# Patient Record
Sex: FEMALE | Race: WHITE
[De-identification: names, ages, dates, MRNs, and addresses within clinical notes are randomized per-mention and may not be internally consistent; named-entity substitution may affect disease eponyms.]

---
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
## 2018-07-30 ENCOUNTER — HOSPITAL ENCOUNTER (EMERGENCY)
Dept: HOSPITAL 75 - ER | Age: 18
Discharge: HOME | End: 2018-07-30
Payer: MEDICAID

## 2018-07-30 VITALS — BODY MASS INDEX: 19.81 KG/M2 | WEIGHT: 116 LBS | HEIGHT: 64 IN

## 2018-07-30 VITALS — SYSTOLIC BLOOD PRESSURE: 143 MMHG | DIASTOLIC BLOOD PRESSURE: 97 MMHG

## 2018-07-30 DIAGNOSIS — O99.711: Primary | ICD-10-CM

## 2018-07-30 DIAGNOSIS — Z88.1: ICD-10-CM

## 2018-07-30 DIAGNOSIS — Z3A.13: ICD-10-CM

## 2018-07-30 DIAGNOSIS — Z79.52: ICD-10-CM

## 2018-07-30 DIAGNOSIS — L30.9: ICD-10-CM

## 2018-07-30 DIAGNOSIS — Z87.891: ICD-10-CM

## 2018-07-30 PROCEDURE — 99282 EMERGENCY DEPT VISIT SF MDM: CPT

## 2018-07-30 NOTE — XMS REPORT
Continuity of Care Document (Ambulatory Transition of Care)

 Created on: 2018



Kelsie Pruitt

External Reference #: [id number]

: 2000

Sex: Female



Demographics







 Address  [address] [place] [address]  [address]

 

 Home Phone  [phone]

 

 Preferred Language  English

 

 Marital Status  Never 

 

 Yarsanism Affiliation  Unknown

 

 Race  Other Race

 

 Ethnic Group  Not  or 





Author







 Author  [facility]

 

 Organization  [facility]

 

 Address  [address] [place]  [address]



 

 Phone  [phone]







Care Team Providers







 Care Team Member Name  Role  Phone

 

 Unknown, None  PCP  Unavailable

 

 Nirmala Mott  Attphys  [phone]







Allergies, Adverse Reactions, Alerts

No allergy information available.



Medications

No medication information available.



Problem List

No problem information available.



Procedures

No known history of procedures.



Reason for Referral

Referral information is unavailable.



Relevant Diagnostic Tests and/or Laboratory Data

Laboratory Results





 Test  Date/Time  Result  Interp.  Ref. Range  Result Comment

 

 Bedside Urine Pregnancy Test  2018 11:00am  POSITIVE(N=NEGATIVE)     
    

 

 Bedside Urine Pregnancy Test QC  2018 11:00am  ACCEPTABLE         

 

 POC U Pregnancy Lot Number  2018 11:00am  [id number]         

 

 POC U Pregnancy Expiration Date  2018 11:00am  2019         









Chief Complaint and Reason for Visit







 Encounter  Admit Date  Chief Complaint  Reason for Visit

 

 Departed Physician/Provider Office Visit  2018 4:38pm  Eczema/ Preg. 
test   







Hospital Discharge Instructions

No known hospital discharge instructions.



Encounters







 Encounter  Facility  Location  Admit/Visit Date  Discharge/Departure Date  
Attending Provider

 

 Departed Physician/Provider Office Visit  [facility] [facility]  2018 4:38pm  2018 5:26pm  Nirmala Mott







Functional Status

No known functional status.



Immunizations

No known immunizations.



Payers







 Payer Name  Policy Type  Covered Party  Covered Party Id  Relationship  
Subscriber  Subscriber Id

 

 [employer]  Other  Kelsie Edmond  [id number]  Self / Same As 
Patient  Kelsie Edmond  [id number]

 

 Self Pay  Other               







Plan of Care

No Known Plan of Care Information



Social History

No known social history.



Vital Signs

No known vital signs results.
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
## 2018-07-30 NOTE — ED INTEGUMENTARY GENERAL
General


Chief Complaint:  Skin/Wound Problems


Stated Complaint:  RASH


Nursing Triage Note:  


exema


Source:  patient





History of Present Illness


Date Seen by Provider:  2018


Time Seen by Provider:  23:00


Initial Comments


C/O ECZEMA


HAS HAD ECZEMA "ALL HER LIFE" 


HAS BEEN WORSE LATELY AND ESPECIALLY THE LAST FEW WEEKS AND REALLY BAD THE LAST 

3 DAYS


PT STATES SHE IS 13 WEEKS PREGNANT AND "AFRAID THE ECZEMA WILL HURT THE BABY" 


PT STATES SHE HAD HER FIRST OB APPOINTMENT 2 WEEKS AGO AND WAS GIVEN RX FOR 

HYDROCORTISONE CREAM, BUT HAS NOT USED TODAY


NO NEW PRODUCTS, MEDICATIONS, OR EXPOSURES


PT STATES SHE HAD + HOME PREGNANCY TEST 3-4 WEEKS AGO


STATES SHE IS NOT TAKING PRENATAL VITAMINS





[facility]


STATES SHE HAD BRIEFLY MOVED TO [place], [place] FOR 5 MONTHS, BUT JUST MOVED BACK TO 

[place] TODAY.





Allergies and Home Medications


Allergies


Coded Allergies:  


     cephalexin (Verified  Allergy, Intermediate, RASH, 7/2/15)


 


 severe eczema flare





Home Medications


Methylprednisolone 4 Mg Tab.ds.pk, 4 MG PO UD


   Prescribed by: GINO KERR on 18


Mometasone Furoate 15 Gm Cream..g., 0 TP TID


   Prescribed by: GINO KERR on 18





Patient Home Medication List


Home Medication List Reviewed:  Yes





Constitutional:  no symptoms reported


EENTM:  no symptoms reported


Respiratory:  no symptoms reported


Cardiovascular:  no symptoms reported


Gastrointestinal:  no symptoms reported


Pregnant:  Yes


LMP:  2018


Musculoskeletal:  no symptoms reported


Skin:  see HPI, pruritus, rash


Psychiatric/Neurological:  No Symptoms Reported


Endocrine:  No Symptoms Reported


Hematologic/Lymphatic:  No Symptoms Reported





Past Medical-Social-Family Hx


Patient Social History


Alcohol Use:  Denies Use


Recreational Drug Use:  No


Smoking Status:  Former Smoker


Type Used:  Cigarettes


2nd Hand Smoke Exposure:  Yes


Recent Foreign Travel:  No


Contact w/Someone Who Travel:  No


Recent Infectious Disease Expo:  No


Recent Hopitalizations:  No





Immunizations Up To Date


Tetanus Booster (TDap):  Less than 5yrs


PED Vaccines UTD:  Yes


Date of Influenza Vaccine:  Oct 26, 2017





Seasonal Allergies


Seasonal Allergies:  Yes





Past Medical History


Surgeries:  No


Respiratory:  No


Cardiac:  No


Neurological:  No


Pregnant:  Yes


Hx :  1


Hx Para:  0


Hx Total # of Abortions (Sp):  0


Reproductive Disorders:  No


Genitourinary:  No


Gastrointestinal:  No


Musculoskeletal:  No


Endocrine:  No


HEENT:  No


Cancer:  No


Psychosocial:  No


Integumentary:  Yes


Eczema, Recent Skin Changes


Blood Disorders:  No


Adverse Reaction/Blood Tranf:  No





Family Medical History


No Pertinent Family Hx





Physical Exam


Vital Signs





Vital Signs - First Documented








 18





 22:55 23:12


 


Temp 98.0 


 


Pulse 113 


 


Resp 18 


 


B/P (MAP) 143/97 


 


Pulse Ox  95


 


O2 Delivery Room Air 





Capillary Refill :


General Appearance:  WD/WN, no apparent distress, other (LAUGHING)


Cardiovascular:  normal peripheral pulses, regular rate, rhythm, no murmur


Respiratory:  normal breath sounds


Extremities:  normal range of motion, no pedal edema, normal capillary refill


Neurologic/Psychiatric:  CNs II-XII nml as tested, no motor/sensory deficits, 

alert, normal mood/affect, oriented x 3


Skin:  normal color, warm/dry, rash (DIFFUSE/PATCHY, DRY, SLIGHTLY SCALY, 

MACULOPAPULAR RASH  SCATTERED ON AC SPACES AND ELBOWS, FOREARMS, POSTERIOR 

THIGHS, POPLITEAL AREAS, LOWER BACK AND BUTTOCKS, ABDOMEN--HAS TYPICAL 

APPEARANCE OF ECZEMA)





Progress/Results/Core Measures


Results/Orders


Vital Signs/I&O











 18





 22:55 23:12


 


Temp 98.0 98.0


 


Pulse 113 113


 


Resp 18 18


 


B/P (MAP) 143/97 143/97


 


Pulse Ox  95


 


O2 Delivery Room Air Room Air











Departure


Impression





 Primary Impression:  


 Eczema


Disposition:  01 HOME, SELF-CARE


Condition:  Stable





Departure-Patient Inst.


Referrals:  


[facility] (PCP/Family)


Primary Care Physician


Patient Instructions:  Eczema (Atopic Dermatitis) (DC)





Add. Discharge Instructions:  


FOLLOW UP WITH [facility] THIS WEEK FOR FURTHER CARE





All discharge instructions reviewed with patient and/or family. Voiced 

understanding.


Scripts


Mometasone Furoate (Elocon) 15 Gm Cream..g.


0 TP TID, #1 TUBE


   Prov: GINO KERR DO         18 


Methylprednisolone (Medrol) 4 Mg Tab.ds.pk


4 MG PO UD, #1 PKG


   Prov: GINO KERR DO         18











GINO KERR DO 2018 23:07

## 2018-07-30 NOTE — XMS REPORT
Continuity of Care Document (Ambulatory Transition of Care)

 Created on: 2018



Emamunir Kelsie

External Reference #: [id number]

: 2000

Sex: Female



Demographics







 Address  [address] [place] [address]  [address]

 

 Home Phone  [phone]

 

 Preferred Language  English

 

 Marital Status  Never 

 

 [employer] Affiliation  Unknown

 

 Race  Other Race

 

 Ethnic Group  Not  or 





Author







 Author  [facility]

 

 Organization  [facility]

 

 Address  [address] [place]  [address]



 

 Phone  [phone]







Care Team Providers







 Care Team Member Name  Role  Phone

 

 Unknown, None  PCP  Unavailable

 

 Kwaku Walker  Attphys  [phone]







Allergies, Adverse Reactions, Alerts







 Allergen  Type  Severity  Reaction  Last Updated  Verified  Status

 

 cephalexin  Allergy  Moderate  Hives  2018  Y  Active







Medications

Active Medications





 Medication  Dose  Units  Route  Sig  Qty  Start Date  Status

 

 Hydrocortisone 2.5% Lotion  1  APPLIC  Topical  twice a day  118  2018  Active







Problem List

No problem information available.



Procedures

No known history of procedures.



Reason for Referral

Referral information is unavailable.



Relevant Diagnostic Tests and/or Laboratory Data

Laboratory Results





 Test  Date/Time  Result  Interp.  Ref. Range  Result Comment

 

 Bedside Urine Pregnancy Test  2018 11:00am  POSITIVE(N=NEGATIVE)     
    

 

 Bedside Urine Pregnancy Test QC  2018 11:00am  ACCEPTABLE         

 

 POC U Pregnancy Lot Number  2018 11:00am  [id number]         

 

 POC U Pregnancy Expiration Date  2018 11:00am  2019         









Chief Complaint and Reason for Visit







 Encounter  Admit Date  Chief Complaint  Reason for Visit

 

 Departed Physician/Provider Office Visit  2018 9:38am  pregnancy   







Hospital Discharge Instructions

No known hospital discharge instructions.



Hospital Discharge Medications







 Medication  Dose  Units  Route  Sig  Qty  Days  Order Date  Status  
Instructions

 

 Hydrocortisone 2.5% Lotion  1  APPLIC  Topical  twice a day  118     2018  Active   







Encounters







 Encounter  Facility  Location  Admit/Visit Date  Discharge/Departure Date  
Attending Provider

 

 Departed Physician/Provider Office Visit  [facility]  2018 9:38am  2018 10:36am  Kwaku Walker

 

 Departed Physician/Provider Office Visit  [facility]  2018 4:38pm  2018 5:26pm  Nirmala Mott







Functional Status

No known functional status.



Immunizations

No known immunizations.



Payers







 Payer Name  Policy Type  Covered Party  Covered Party Id  Relationship  
Subscriber  Subscriber Id

 

 [employer]  Other  Kelsie Pruitt  [id number]  Self / Same As 
Patient  Kelsie Pruitt  [id number]

 

 Self Pay  Other               







Plan of Care

No Known Plan of Care Information



Social History

No known social history.



Vital Signs







 Vital Reading  Result  Reference Range  Collection Date/Time

 

 Height  5 ft 4.5 in     2018 10:40am

 

 Weight  112 lb     2018 10:40am

 

 Temperature  98.3 F  97.6 F-99.5 F  2018 10:40am

 

 Pulse  101 BPM    2018 10:40am

 

 Respiration  18 RPM  10-24  2018 10:40am

 

 Pulse Oximetry  100 %    2018 10:40am

 

 Blood Pressure Systolic  126  100-180  2018 10:40am

 

 Blood Pressure Diastolic  70  60-90  2018 10:40am

 

 Body Mass Index  18.9     2018 10:40am

## 2018-08-16 ENCOUNTER — HOSPITAL ENCOUNTER (EMERGENCY)
Dept: HOSPITAL 75 - ER | Age: 18
Discharge: HOME | End: 2018-08-16
Payer: MEDICAID

## 2018-08-16 VITALS — WEIGHT: 115 LBS | BODY MASS INDEX: 19.63 KG/M2 | HEIGHT: 64 IN

## 2018-08-16 DIAGNOSIS — Z88.8: ICD-10-CM

## 2018-08-16 DIAGNOSIS — Z79.52: ICD-10-CM

## 2018-08-16 DIAGNOSIS — L30.9: ICD-10-CM

## 2018-08-16 DIAGNOSIS — Z88.1: ICD-10-CM

## 2018-08-16 DIAGNOSIS — O99.712: Primary | ICD-10-CM

## 2018-08-16 DIAGNOSIS — L03.115: ICD-10-CM

## 2018-08-16 DIAGNOSIS — Z87.440: ICD-10-CM

## 2018-08-16 DIAGNOSIS — Z87.891: ICD-10-CM

## 2018-08-16 DIAGNOSIS — L03.116: ICD-10-CM

## 2018-08-16 DIAGNOSIS — Z3A.15: ICD-10-CM

## 2018-08-16 LAB
ALBUMIN SERPL-MCNC: 3.9 GM/DL (ref 3.2–4.5)
ALP SERPL-CCNC: 50 U/L (ref 40–136)
ALT SERPL-CCNC: 16 U/L (ref 0–55)
APTT PPP: YELLOW S
BACTERIA #/AREA URNS HPF: (no result) /HPF
BARBITURATES UR QL: NEGATIVE
BASOPHILS # BLD AUTO: 0.1 10^3/UL (ref 0–0.1)
BASOPHILS NFR BLD AUTO: 1 % (ref 0–10)
BENZODIAZ UR QL SCN: NEGATIVE
BILIRUB SERPL-MCNC: 0.4 MG/DL (ref 0.1–1)
BILIRUB UR QL STRIP: NEGATIVE
BUN/CREAT SERPL: 8
CALCIUM SERPL-MCNC: 9 MG/DL (ref 8.5–10.1)
CHLORIDE SERPL-SCNC: 108 MMOL/L (ref 98–107)
CK MB SERPL-MCNC: 0.5 NG/ML (ref ?–6.6)
CK SERPL-CCNC: 25 U/L (ref 29–168)
CO2 SERPL-SCNC: 18 MMOL/L (ref 21–32)
COCAINE UR QL: NEGATIVE
CREAT SERPL-MCNC: 0.61 MG/DL (ref 0.6–1.3)
EOSINOPHIL # BLD AUTO: 1.3 10^3/UL (ref 0–0.3)
EOSINOPHIL NFR BLD AUTO: 11 % (ref 0–10)
ERYTHROCYTE [DISTWIDTH] IN BLOOD BY AUTOMATED COUNT: 13.9 % (ref 10–14.5)
ERYTHROCYTE [SEDIMENTATION RATE] IN BLOOD: 13 MM/HR (ref 0–20)
FIBRINOGEN PPP-MCNC: (no result) MG/DL
GFR SERPLBLD BASED ON 1.73 SQ M-ARVRAT: > 60 ML/MIN
GLUCOSE SERPL-MCNC: 126 MG/DL (ref 70–105)
GLUCOSE UR STRIP-MCNC: NEGATIVE MG/DL
HCT VFR BLD CALC: 38 % (ref 35–52)
HGB BLD-MCNC: 13.6 G/DL (ref 11.5–16)
KETONES UR QL STRIP: NEGATIVE
LEUKOCYTE ESTERASE UR QL STRIP: (no result)
LYMPHOCYTES # BLD AUTO: 2 X 10^3 (ref 1–4)
LYMPHOCYTES NFR BLD AUTO: 18 % (ref 12–44)
MANUAL DIFFERENTIAL PERFORMED BLD QL: NO
MCH RBC QN AUTO: 34 PG (ref 25–34)
MCHC RBC AUTO-ENTMCNC: 36 G/DL (ref 32–36)
MCV RBC AUTO: 93 FL (ref 80–99)
METHADONE UR QL SCN: NEGATIVE
METHAMPHETAMINE SCREEN URINE S: NEGATIVE
MONOCYTES # BLD AUTO: 0.7 X 10^3 (ref 0–1)
MONOCYTES NFR BLD AUTO: 6 % (ref 0–12)
NEUTROPHILS # BLD AUTO: 7.2 X 10^3 (ref 1.8–7.8)
NEUTROPHILS NFR BLD AUTO: 64 % (ref 42–75)
NITRITE UR QL STRIP: NEGATIVE
OPIATES UR QL SCN: NEGATIVE
OXYCODONE UR QL: NEGATIVE
PH UR STRIP: 6 [PH] (ref 5–9)
PLATELET # BLD: 262 10^3/UL (ref 130–400)
PMV BLD AUTO: 8.9 FL (ref 7.4–10.4)
POTASSIUM SERPL-SCNC: 3.7 MMOL/L (ref 3.6–5)
PROPOXYPH UR QL: NEGATIVE
PROT SERPL-MCNC: 6.4 GM/DL (ref 6.4–8.2)
PROT UR QL STRIP: NEGATIVE
RBC # BLD AUTO: 4.03 10^6/UL (ref 4.35–5.85)
RBC #/AREA URNS HPF: (no result) /HPF
SODIUM SERPL-SCNC: 137 MMOL/L (ref 135–145)
SP GR UR STRIP: 1.02 (ref 1.02–1.02)
SQUAMOUS #/AREA URNS HPF: >50 /HPF
TRICYCLICS UR QL SCN: NEGATIVE
UROBILINOGEN UR-MCNC: 1 MG/DL
WBC # BLD AUTO: 11.2 10^3/UL (ref 4.3–11)
WBC #/AREA URNS HPF: (no result) /HPF

## 2018-08-16 PROCEDURE — 82550 ASSAY OF CK (CPK): CPT

## 2018-08-16 PROCEDURE — 80306 DRUG TEST PRSMV INSTRMNT: CPT

## 2018-08-16 PROCEDURE — 87077 CULTURE AEROBIC IDENTIFY: CPT

## 2018-08-16 PROCEDURE — 82553 CREATINE MB FRACTION: CPT

## 2018-08-16 PROCEDURE — 87430 STREP A AG IA: CPT

## 2018-08-16 PROCEDURE — 86141 C-REACTIVE PROTEIN HS: CPT

## 2018-08-16 PROCEDURE — 81000 URINALYSIS NONAUTO W/SCOPE: CPT

## 2018-08-16 PROCEDURE — 87205 SMEAR GRAM STAIN: CPT

## 2018-08-16 PROCEDURE — 85025 COMPLETE CBC W/AUTO DIFF WBC: CPT

## 2018-08-16 PROCEDURE — 87186 SC STD MICRODIL/AGAR DIL: CPT

## 2018-08-16 PROCEDURE — 85652 RBC SED RATE AUTOMATED: CPT

## 2018-08-16 PROCEDURE — 80053 COMPREHEN METABOLIC PANEL: CPT

## 2018-08-16 PROCEDURE — 36415 COLL VENOUS BLD VENIPUNCTURE: CPT

## 2018-08-16 PROCEDURE — 87070 CULTURE OTHR SPECIMN AEROBIC: CPT

## 2018-08-16 NOTE — ED GENERAL
General


Chief Complaint:  Skin/Wound Problems


Stated Complaint:  BACK OF LEGS OOZING GREEN STUFF, RASH ON FACE


Nursing Triage Note:  


Pt reports rash to bilat legs and arms. Pt reports she has hx eczema and states 


she thinks rash is infected and it is painful to walk. Pt also reports she is 


approx 15 weeks pregnant. Pt reports she is currently on amoxicillin for UTI.


Source of Information:  Patient, Old Records


Exam Limitations:  No Limitations





History of Present Illness


Date Seen by Provider:  Aug 16, 2018


Time Seen by Provider:  20:30


Initial Comments


This 17-year-old at approximately 15 weeks gestational age presents to the 

emergency room with severe worsening of her chronic eczema.  She was seen in 

this ER about 2 weeks ago for the same.  She has tried multiple types of 

topical antibiotics, a Medrol Dosepak, and Elocon without significant relief.  

She has burning of the skin particularly in the legs.  She has excoriations on 

her extremities and trunk and warm erythema on both thighs.  Distribution 

appears fairly symmetrical.  Patient is also noted to be tachycardic and 

appears dry.  Patient has been using amoxicillin for a couple of days due to 

urinary tract infection.  Patient states oral steroids use previously were not 

helpful in treating the rash.  She has a new prescription for topical 

medications prescribed by Dr. MENON but has not filled it yet.  Review of 

patient's chart notes that she also had a urine drug screen positive for 

cocaine last December.





Allergies and Home Medications


Allergies


Coded Allergies:  


     cephalexin (Verified  Allergy, Intermediate, RASH, 7/2/15)


 


 severe eczema flare


     metronidazole (Unverified  Allergy, Unknown, 8/16/18)





Home Medications


Clindamycin HCl 300 Mg Capsule, 300 MG PO QID


   Prescribed by: DARLENE HELMS on 8/16/18 2150


Methylprednisolone 4 Mg Tab.ds.pk, 4 MG PO UD


   Prescribed by: GINO KERR on 7/30/18 2307


Mometasone Furoate 15 Gm Cream..g., 0 TP TID


   Prescribed by: GINO KERR on 7/30/18 2307





Patient Home Medication List


Home Medication List Reviewed:  Yes





Review of Systems


Constitutional:  no symptoms reported


EENTM:  no symptoms reported


Respiratory:  no symptoms reported


Cardiovascular:  no symptoms reported


Gastrointestinal:  no symptoms reported


Genitourinary:  no symptoms reported


Pregnant:  Yes


LMP:  Apr 25, 2018


Musculoskeletal:  no symptoms reported


Skin:  see HPI


Psychiatric/Neurological:  No Symptoms Reported


Hematologic/Lymphatic:  No Symptoms Reported


Immunological/Allergic:  no symptoms reported





Past Medical-Social-Family Hx


Past Med/Social Hx:  Reviewed Nursing Past Med/Soc Hx


Patient Social History


Alcohol Use:  Denies Use


Recreational Drug Use:  No


Smoking Status:  Former Smoker


Type Used:  Cigarettes


2nd Hand Smoke Exposure:  Yes


Recent Foreign Travel:  No


Contact w/Someone Who Travel:  No


Recent Infectious Disease Expo:  No


Recent Hopitalizations:  No





Immunizations Up To Date


Tetanus Booster (TDap):  Less than 5yrs


PED Vaccines UTD:  Yes


Date of Influenza Vaccine:  Oct 26, 2017





Seasonal Allergies


Seasonal Allergies:  Yes





Past Medical History


Surgeries:  No


Respiratory:  No


Cardiac:  No


Neurological:  No


Reproductive Disorders:  No


Genitourinary:  No


Gastrointestinal:  No


Musculoskeletal:  No


Endocrine:  No


HEENT:  No


Cancer:  No


Psychosocial:  No


Integumentary:  Yes


Eczema, Recent Skin Changes


Blood Disorders:  No


Adverse Reaction/Blood Tranf:  No





Family Medical History


Reviewed Nursing Family Hx


No Pertinent Family Hx





Physical Exam


Vital Signs





Vital Signs - First Documented








 8/16/18





 20:32


 


Temp 97.8


 


Pulse 113


 


Resp 20


 


B/P (MAP) 131/88


 


O2 Delivery Room Air





Capillary Refill :


Height, Weight, BMI


Height: 5'4.00"


Weight: 115lbs. oz. 52.669782br; 14.06 BMI


Method:Stated


General Appearance:  WD/WN, Mild Distress


HEENT:  PERRL/EOMI, Normal ENT Inspection, Pharynx Normal


Neck:  Normal Inspection


Respiratory:  Lungs Clear, Normal Breath Sounds, No Accessory Muscle Use, No 

Respiratory Distress


Cardiovascular:  No Edema, No Murmur, Tachycardia (regular)


Gastrointestinal:  Normal Bowel Sounds, Non Tender, Soft


Extremity:  Normal Capillary Refill, Other (excoriations throughout the 

extremities with warm blanching erythema on the thighs.  There are some areas 

of excoriation with clear weeping on the posterior legs and thighs.  Rash on 

the abdomen has a fine, rest, maculopapular a appearance.)


Neurologic/Psychiatric:  Alert, Oriented x3, No Motor/Sensory Deficits, Normal 

Mood/Affect, CNs II-XII Norm as Tested, Other (mild generalized tremor)


Skin:  Rash, Other (see above)





Progress/Results/Core Measures


Suspected Sepsis


SIRS


Temperature:97.8 


Pulse:  


Respiratory Rate: 


 


Laboratory Tests


8/16/18 20:45: White Blood Count 11.2H


Blood Pressure  / 


Mean: 


 





Laboratory Tests


8/16/18 20:45: 


Creatinine 0.61, Platelet Count 262, Total Bilirubin 0.4








Results/Orders


Lab Results





Laboratory Tests








Test


 8/16/18


20:38 8/16/18


20:45 8/16/18


21:00 8/16/18


21:04 Range/Units


 


 


Group A Streptococcus Screen NEGATIVE     NEGATIVE  


 


White Blood Count


 


 11.2 H


 


 


 4.3-11.0


10^3/uL


 


Red Blood Count


 


 4.03 L


 


 


 4.35-5.85


10^6/uL


 


Hemoglobin  13.6    11.5-16.0  G/DL


 


Hematocrit  38    35-52  %


 


Mean Corpuscular Volume  93    80-99  FL


 


Mean Corpuscular Hemoglobin  34    25-34  PG


 


Mean Corpuscular Hemoglobin


Concent 


 36 


 


 


 32-36  G/DL





 


Red Cell Distribution Width  13.9    10.0-14.5  %


 


Platelet Count


 


 262 


 


 


 130-400


10^3/uL


 


Mean Platelet Volume  8.9    7.4-10.4  FL


 


Neutrophils (%) (Auto)  64    42-75  %


 


Lymphocytes (%) (Auto)  18    12-44  %


 


Monocytes (%) (Auto)  6    0-12  %


 


Eosinophils (%) (Auto)  11 H   0-10  %


 


Basophils (%) (Auto)  1    0-10  %


 


Neutrophils # (Auto)  7.2    1.8-7.8  X 10^3


 


Lymphocytes # (Auto)  2.0    1.0-4.0  X 10^3


 


Monocytes # (Auto)  0.7    0.0-1.0  X 10^3


 


Eosinophils # (Auto)


 


 1.3 H


 


 


 0.0-0.3


10^3/uL


 


Basophils # (Auto)


 


 0.1 


 


 


 0.0-0.1


10^3/uL


 


Erythrocyte Sedimentation Rate  13    0-20  MM/HR


 


Sodium Level  137    135-145  MMOL/L


 


Potassium Level  3.7    3.6-5.0  MMOL/L


 


Chloride Level  108 H     MMOL/L


 


Carbon Dioxide Level  18 L   21-32  MMOL/L


 


Anion Gap  11    5-14  MMOL/L


 


Blood Urea Nitrogen  5 L   7-18  MG/DL


 


Creatinine


 


 0.61 


 


 


 0.60-1.30


MG/DL


 


Estimat Glomerular Filtration


Rate 


 > 60 


 


 


  





 


BUN/Creatinine Ratio  8     


 


Glucose Level  126 H     MG/DL


 


Calcium Level  9.0    8.5-10.1  MG/DL


 


Corrected Calcium  9.1    8.5-10.1  MG/DL


 


Total Bilirubin  0.4    0.1-1.0  MG/DL


 


Aspartate Amino Transf


(AST/SGOT) 


 18 


 


 


 5-34  U/L





 


Alanine Aminotransferase


(ALT/SGPT) 


 16 


 


 


 0-55  U/L





 


Alkaline Phosphatase  50      U/L


 


Creatine Kinase MB  0.5    <6.6  NG/ML


 


Total Protein  6.4    6.4-8.2  GM/DL


 


Albumin  3.9    3.2-4.5  GM/DL


 


Total Creatine Kinase   25 L    U/L


 


C-Reactive Protein High


Sensitivity 


 


 2.80 H


 


 0.00-0.50


MG/DL


 


Urine Color    YELLOW   


 


Urine Clarity


 


 


 


 SLIGHTLY


CLOUDY  





 


Urine pH    6  5-9  


 


Urine Specific Gravity    1.020  1.016-1.022  


 


Urine Protein    NEGATIVE  NEGATIVE  


 


Urine Glucose (UA)    NEGATIVE  NEGATIVE  


 


Urine Ketones    NEGATIVE  NEGATIVE  


 


Urine Nitrite    NEGATIVE  NEGATIVE  


 


Urine Bilirubin    NEGATIVE  NEGATIVE  


 


Urine Urobilinogen    1  NORMAL  MG/DL


 


Urine Leukocyte Esterase    2+ H NEGATIVE  


 


Urine RBC (Auto)    1+ H NEGATIVE  


 


Urine RBC    2-5 H  /HPF


 


Urine WBC    2-5   /HPF


 


Urine Squamous Epithelial


Cells 


 


 


 >50 H


  /HPF





 


Urine Crystals    NONE   /LPF


 


Urine Bacteria    LARGE H  /HPF


 


Urine Casts    NONE   /LPF


 


Urine Mucus    NEGATIVE   /LPF


 


Urine Culture Indicated    NO   


 


Urine Opiates Screen    NEGATIVE  NEGATIVE  


 


Urine Oxycodone Screen    NEGATIVE  NEGATIVE  


 


Urine Methadone Screen    NEGATIVE  NEGATIVE  


 


Urine Propoxyphene Screen    NEGATIVE  NEGATIVE  


 


Urine Barbiturates Screen    NEGATIVE  NEGATIVE  


 


Ur Tricyclic Antidepressants


Screen 


 


 


 NEGATIVE 


 NEGATIVE  





 


Urine Phencyclidine Screen    NEGATIVE  NEGATIVE  


 


Urine Amphetamines Screen    NEGATIVE  NEGATIVE  


 


Urine Methamphetamines Screen    NEGATIVE  NEGATIVE  


 


Urine Benzodiazepines Screen    NEGATIVE  NEGATIVE  


 


Urine Cocaine Screen    NEGATIVE  NEGATIVE  


 


Urine Cannabinoids Screen    POSITIVE H NEGATIVE  








My Orders





Orders - DARLENE DURAN MD


Saline Lock/Iv-Start (8/16/18 20:41)


Ns Iv 1000 Ml (Sodium Chloride 0.9%) (8/16/18 20:41)


Cbc With Automated Diff (8/16/18 20:41)


Comprehensive Metabolic Panel (8/16/18 20:41)


Creatine Kinase Mb (8/16/18 20:41)


Erythrocyte Sedimentation Rate (8/16/18 20:41)


Rapid Strep A Screen (8/16/18 20:41)


Drug Screen Stat (Urine) (8/16/18 20:43)


Diphenhydramine Injection (Benadryl Inje (8/16/18 20:45)


Wound Culture (8/16/18 20:56)


Creatine Kinase (8/16/18 21:00)


Hs C Reactive Protein (8/16/18 21:01)


Ua Culture If Indicated (8/16/18 21:09)


Clindamycin 900 Mg/50 Ml Ivpb (Cleocin P (8/16/18 21:45)





Medications Given in ED





Current Medications








 Medications  Dose


 Ordered  Sig/Oracio


 Route  Start Time


 Stop Time Status Last Admin


Dose Admin


 


 Clindamycin


 Phosphate/Dextrose  50 ml @ 


 100 mls/hr  ONCE  ONCE


 IV  8/16/18 21:45


 8/16/18 22:14 DC 8/16/18 21:47


100 MLS/HR


 


 Diphenhydramine


 HCl  25 mg  ONCE  ONCE


 IVP  8/16/18 20:45


 8/16/18 20:47 DC 8/16/18 20:50


25 MG


 


 Sodium Chloride  1,000 ml @ 


 0 mls/hr  Q0M ONCE


 IV  8/16/18 20:41


 8/16/18 20:43 DC 8/16/18 20:46


0 MLS/HR








Vital Signs/I&O











 8/16/18





 20:32


 


Temp 97.8


 


Pulse 113


 


Resp 20


 


B/P (MAP) 131/88


 


O2 Delivery Room Air





Capillary Refill :


Progress Note #1:  


Progress Note


Patient seen and examined.  Inflammatory markers are being drawn along with 

basic labs.  IV fluids have been initiated due to tachycardia.  UDS is being 

obtained.  One culture was collected from the drainage on the posterior thighs.


Progress Note #2:  


Progress Note


Labs were relatively unremarkable.  Given the warm erythema of the thighs, 

secondary cellulitis was a concern.  A dose of IV clindamycin was administered 

in the ER and her oral antibiotics were switched to clindamycin from 

amoxicillin.  Patient was given Benadryl for her itching and tremoring which 

was moderately effective.  She was hydrated with a liter of IV fluid with 

improvement in heart rate.  Urinalysis was positive for THC which patient 

states is from secondhand exposure.  She was advised to avoid exposure to 

psychoactive substances as they may make her symptoms worsen and can be 

dangerous to the pregnancy.  We discussed continued use of topical 

triamcinolone as well and nonpharmacologic interventions for eczema.  I also 

advised that a referral to a dermatologist may be wise.  See discharge 

instructions.





Departure


Impression





 Primary Impression:  


 Cellulitis of lower extremity


 Qualified Codes:  L03.119 - Cellulitis of unspecified part of limb


 Additional Impression:  


 Eczema


 Qualified Codes:  L30.9 - Dermatitis, unspecified


Disposition:  01 HOME, SELF-CARE


Condition:  Improved





Departure-Patient Inst.


Decision time for Depature:  21:40


Referrals:  


[facility] (PCP/Family)


Primary Care Physician


Patient Instructions:  Cellulitis (Skin Infection), Adult (DC), Eczema (Atopic 

Dermatitis)





Add. Discharge Instructions:  


You may continue using triamcinolone topically as previously directed.


Avoid excessive use of hot water and soaps when bathing.


Use the triamcinolone on top of moist skin after bathing.


Change the amoxicillin you were taking to clindamycin for possible skin 

cellulitis.  Return to care if symptoms are worsening, especially if you 

develop fevers over 100.


For itching you may take Benadryl up to 25 mg every 4 hours as needed.  It 

would also be wise to take a long-acting antihistamine such as Claritin (

loratadine) or Zyrtec (cetirizine) every day.


Return to your primary care provider soon as possible.  Call tomorrow morning 

for an appointment.  Review culture results with your provider early next week.


For pain you may take Tylenol (acetaminophen) up to 650 mg every 6 hours as 

needed.











All discharge instructions reviewed with patient and/or family. Voiced 

understanding.


Scripts


Clindamycin HCl (Clindamycin HCl) 300 Mg Capsule


300 MG PO QID, #40 CAP


   Prov: DARLENE DURAN MD         8/16/18





Copy


Copies To 1:   KIM MENON DO


Copies To 2:   TEJ PAREKH JOSHUA T MD Aug 16, 2018 20:52
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
## 2018-08-17 NOTE — XMS REPORT
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        

 

 Complete urinalysis with reflex to culture            YES             NRG     
   

 

 Amorphous sediment detection in urine sediment by light microscopy            
MOD ELDA URATES             NRG        









 Bacterial urine culture - 17 23:40         









 URINE CULTURE RESULTS            10,000/ML - 100,000/ML             NRG        









 Urine drug screening test - 17 01:26         









 Urine phencyclidine detection by screening method            NEGATIVE         
    NEGATIVE        

 

 Urine benzodiazepines detection by screening method            NEGATIVE       
      NEGATIVE        

 

 Urine cocaine detection            POSITIVE             NEGATIVE        

 

 Urine amphetamines detection by screening method            NEGATIVE          
   NEGATIVE        

 

 Urine methamphetamine detection by screening method            NEGATIVE       
      NEGATIVE        

 

 Urine cannabinoids detection by screening method            NEGATIVE          
   NEGATIVE        

 

 Urine opiates detection by screening method            NEGATIVE             
NEGATIVE        

 

 Urine barbiturates detection            NEGATIVE             NEGATIVE        

 

 Screening urine tricyclic antidepressants detection            NEGATIVE       
      NEGATIVE        

 

 Urine methadone detection by screening method            NEGATIVE             
NEGATIVE        

 

 Urine oxycodone detection            NEGATIVE             NEGATIVE        

 

 Urine propoxyphene detection            NEGATIVE             NEGATIVE        









 Complete urinalysis with reflex to culture - 17 01:26         









 Urine color determination            YELLOW             NRG        

 

 Urine clarity determination            CLEAR             NRG        

 

 Urine pH measurement by test strip            6             5-9        

 

 Specific gravity of urine by test strip            1.025             1.016-
1.022        

 

 Urine protein assay by test strip, semi-quantitative            1+             
NEGATIVE        

 

 Urine glucose detection by automated test strip            NEGATIVE           
  NEGATIVE        

 

 Erythrocytes detection in urine sediment by light microscopy            5+    
         NEGATIVE        

 

 Urine ketones detection by automated test strip            1+             
NEGATIVE        

 

 Urine nitrite detection by test strip            NEGATIVE             NEGATIVE
        

 

 Urine total bilirubin detection by test strip            NEGATIVE             
NEGATIVE        

 

 Urine urobilinogen measurement by automated test strip (mass/volume)          
  NORMAL             NORMAL        

 

 Urine leukocyte esterase detection by dipstick            NEGATIVE             
NEGATIVE        

 

 Automated urine sediment erythrocyte count by microscopy (number/high power 
field)             [HPF]            NRG        

 

 Automated urine sediment leukocyte count by microscopy (number/high power field
)            NONE             NRG        

 

 Bacteria detection in urine sediment by light microscopy            TRACE     
        NRG        

 

 Squamous epithelial cells detection in urine sediment by light microscopy     
       2-5             NRG        

 

 Crystals detection in urine sediment by light microscopy            NONE      
       NRG        

 

 Casts detection in urine sediment by light microscopy            NONE         
    NRG        

 

 Mucus detection in urine sediment by light microscopy            LARGE        
     NRG        

 

 Complete urinalysis with reflex to culture            NO             NRG      
  









 Complete blood count (CBC) with automated white blood cell (WBC) differential 
- 17 02:19         









 Blood leukocytes automated count (number/volume)            10.6 10*3/uL      
      4.3-11.0        

 

 Blood erythrocytes automated count (number/volume)            4.63 10*6/uL    
        4.35-5.85        

 

 Venous blood hemoglobin measurement (mass/volume)            15.1 g/dL        
    11.5-16.0        

 

 Blood hematocrit (volume fraction)            43 %            35-52        

 

 Automated erythrocyte mean corpuscular volume            92 [foz_us]          
  80-99        

 

 Automated erythrocyte mean corpuscular hemoglobin (mass per erythrocyte)      
      33 pg            25-34        

 

 Automated erythrocyte mean corpuscular hemoglobin concentration measurement (
mass/volume)            35 g/dL            32-36        

 

 Automated erythrocyte distribution width ratio            13.0 %            
10.0-14.5        

 

 Automated blood platelet count (count/volume)            247 10*3/uL          
  130-400        

 

 Automated blood platelet mean volume measurement            9.5 [foz_us]      
      7.4-10.4        

 

 Automated blood neutrophils/100 leukocytes            71 %            42-75   
     

 

 Automated blood lymphocytes/100 leukocytes            20 %            12-44   
     

 

 Blood monocytes/100 leukocytes            6 %            0-12        

 

 Automated blood eosinophils/100 leukocytes            2 %            0-10     
   

 

 Automated blood basophils/100 leukocytes            1 %            0-10        

 

 Blood neutrophils automated count (number/volume)            7.5 10*3         
   1.8-7.8        

 

 Blood lymphocytes automated count (number/volume)            2.2 10*3         
   1.0-4.0        

 

 Blood monocytes automated count (number/volume)            0.7 10*3            
0.0-1.0        

 

 Automated eosinophil count            0.2 10*3/uL            0.0-0.3        

 

 Automated blood basophil count (count/volume)            0.1 10*3/uL          
  0.0-0.1        









 Comprehensive metabolic panel - 17 02:19         









 Serum or plasma sodium measurement (moles/volume)            142 mmol/L       
     135-145        

 

 Serum or plasma potassium measurement (moles/volume)            3.3 mmol/L    
        3.6-5.0        

 

 Serum or plasma chloride measurement (moles/volume)            109 mmol/L     
               

 

 Carbon dioxide            20 mmol/L            21-32        

 

 Serum or plasma anion gap determination (moles/volume)            13 mmol/L   
         5-14        

 

 Serum or plasma urea nitrogen measurement (mass/volume)            12 mg/dL   
         7-18        

 

 Serum or plasma creatinine measurement (mass/volume)            0.80 mg/dL    
        0.60-1.30        

 

 Serum or plasma urea nitrogen/creatinine mass ratio            15             
NRG        

 

 Serum or plasma creatinine measurement with calculation of estimated 
glomerular filtration rate            >             NRG        

 

 Serum or plasma glucose measurement (mass/volume)            93 mg/dL         
           

 

 Serum or plasma calcium measurement (mass/volume)            9.9 mg/dL        
    8.5-10.1        

 

 Serum or plasma total bilirubin measurement (mass/volume)            0.4 mg/dL
            0.1-1.0        

 

 Serum or plasma alkaline phosphatase measurement (enzymatic activity/volume)  
          70 U/L                    

 

 Serum or plasma aspartate aminotransferase measurement (enzymatic activity/
volume)            17 U/L            5-34        

 

 Serum or plasma alanine aminotransferase measurement (enzymatic activity/volume
)            12 U/L            0-55        

 

 Serum or plasma protein measurement (mass/volume)            7.6 g/dL         
   6.4-8.2        

 

 Serum or plasma albumin measurement (mass/volume)            4.8 g/dL         
   3.2-4.5        









 Serum or plasma ethanol measurement (mass/volume) - 17 02:19         









 Serum or plasma ethanol measurement (mass/volume)            < mg/dL          
  <10        



                                    



Encounters

      





 ACCT No.            Visit Date/Time            Discharge            Status    
        Pt. Type            Provider            Facility            Loc./Unit  
          Complaint        

 

 [id number]            05/15/2014 11:23:00            05/15/2014 11:23:00         
   DIS            Outpatient            ROX ENCARNACION, CLAUDE ULLOA            [facility]            [facility]                     

 

 [email or web address]            2018 09:07:03                         [facility]            
Document Registration                                                          
  

 

 [email or web address]            2014 05:10:03                         [place]            
Document Registration                                                          
  

 

 [id number]            2018 22:52:00            2018 23:12:00    
        DIS            Outpatient            GINO KERR DO            [facility] 
[facility]            ER            RASH        

 

 [id number]            2017 01:20:00            2017 02:59:00    
        DIS            Emergency            KARMEN ENCARNACION, REILLY PUENTE            [facility] 
[facility]            ER            ASSAULT        

 

 [id number]            2017 23:21:00            2017 01:06:00    
        DIS            Emergency            RENEE ENCARNACION, DARLENE WALL            
[facility]            ER            LEFT SIDE PAIN,VERY 
YELLOW URINE        

 

 [id number]            2017 22:20:00            2017 22:45:00    
        DIS            Emergency            DOROTHY SOLOMON            [facility] 
[facility]            ER            ALLERGIC REACTION        

 

 [id number]            2017 13:07:00            2017 14:07:00    
        DIS            Emergency            DOROTHY SOLOMON            [facility] 
[facility]            ER            ANAL RASH        

 

 [id number]            07/10/2017 19:06:00            07/10/2017 20:49:00    
        DIS            Emergency            DOROTHY SOLOMON            [facility] 
[facility]            ER            COUCH FELL ON RT HAND    
    

 

 [id number]            2017 14:45:00            2017 16:31:00    
        DIS            Emergency            RAY LEWIS            
[facility]            ER            R WRIST/HAND INJ     
   

 

 [id number]            2017 15:15:00            2017 16:47:00    
        DIS            Emergency            SOLOMONDOROTHY            [facility] 
[facility]            ER            L WRIST PAIN        

 

 [id number]            2017 16:30:00            2017 17:31:00    
        DIS            Emergency            NORY CASTAÑEDA MD            
[facility]            ER            BACK PAIN;HEAD PAIN  
      

 

 [id number]            10/30/2016 13:28:00            10/30/2016 14:51:00    
        DIS            Emergency            RAY LEWIS            
[facility]            ER            ECZEMA/STAPH 
INFECTION        

 

 [id number]            2015 10:02:00            2015 15:10:00    
        DIS            Inpatient            ROBERT BENDER MD            [facility] 
[facility]            SURGICAL            DEHYDRATION, RASH  
      

 

 [id number]            2018 09:38:00            2018 10:36:00    
        DIS            Outpatient            Aaron ENCARNACION, [facility]            [email or web address]            pregnancy        

 

 [id number]            2015 14:30:00            2015 23:59:59          
  CLS            Outpatient            ENRIQUETA ENCARNACION, JAYMIE                       
                        

 

 [id number]            2015 16:17:00            2015 23:59:59          
  CLS            Outpatient            NEY AGUILLON                    
                           

 

 [id number]            2015 08:42:00            2015 23:59:59          
  CLS            Outpatient            CAREY LOCKETT                  
                             

 

 [id number]            2015 13:14:00            2015 23:59:59          
  CLS            Outpatient            CAREY LOCKETT                  
                             

 

 [id number]            2015 15:19:00            2015 23:59:59          
  CLS            Outpatient            WILMER EDEN DO A                      
                         

 

 [id number]            2018 14:00:00            2018 23:59:59            
CLS            Outpatient            [facility]OBERT BENDER [facility]                         
[facility]